# Patient Record
Sex: FEMALE | Employment: UNEMPLOYED | URBAN - METROPOLITAN AREA
[De-identification: names, ages, dates, MRNs, and addresses within clinical notes are randomized per-mention and may not be internally consistent; named-entity substitution may affect disease eponyms.]

---

## 2024-07-17 ENCOUNTER — HOSPITAL ENCOUNTER (EMERGENCY)
Facility: HOSPITAL | Age: 44
Discharge: HOME | End: 2024-07-17
Attending: EMERGENCY MEDICINE
Payer: COMMERCIAL

## 2024-07-17 VITALS
RESPIRATION RATE: 17 BRPM | OXYGEN SATURATION: 98 % | DIASTOLIC BLOOD PRESSURE: 66 MMHG | SYSTOLIC BLOOD PRESSURE: 116 MMHG | WEIGHT: 134.26 LBS | TEMPERATURE: 99.2 F | HEART RATE: 74 BPM

## 2024-07-17 DIAGNOSIS — L50.9 URTICARIA: Primary | ICD-10-CM

## 2024-07-17 PROCEDURE — 99284 EMERGENCY DEPT VISIT MOD MDM: CPT | Performed by: EMERGENCY MEDICINE

## 2024-07-17 PROCEDURE — 99282 EMERGENCY DEPT VISIT SF MDM: CPT

## 2024-07-17 RX ORDER — FEXOFENADINE HCL 180 MG/1
180 TABLET ORAL DAILY
Qty: 30 TABLET | Refills: 0 | Status: SHIPPED | OUTPATIENT
Start: 2024-07-17 | End: 2024-08-16

## 2024-07-17 RX ORDER — LORATADINE 10 MG/1
10 TABLET ORAL ONCE
Status: COMPLETED | OUTPATIENT
Start: 2024-07-17 | End: 2024-07-17

## 2024-07-17 RX ORDER — PREDNISONE 20 MG/1
40 TABLET ORAL DAILY
Qty: 10 TABLET | Refills: 0 | Status: SHIPPED | OUTPATIENT
Start: 2024-07-17 | End: 2024-07-22

## 2024-07-17 RX ADMIN — LORATADINE 10 MG: 10 TABLET ORAL at 13:18

## 2024-07-17 NOTE — DISCHARGE INSTRUCTIONS
Empiece a diane fexofenadina según las indicaciones. Laurie es un antihistamínico no sedante que puede ayudar con la picazón. \    Si todavía tiene mucha picazón, puede diane difenhidramina 25 mg - 50 mg cada 6 a 8 horas según sea necesario para la picazón sherif los próximos dos días.    Empiece a diane prednisona hoy y termine los 5 días.    Lexi un seguimiento con amaro médico de atención primaria para sierra evaluación adicional y posibles pruebas de alergia.

## 2024-07-17 NOTE — Clinical Note
Marya Bello was seen and treated in our emergency department on 7/17/2024.                Diagnosis:     Marya  may return to work on return date.    She may return on this date: 07/18/2024         If you have any questions or concerns, please don't hesitate to call.      Alysha Dumont, DO    ______________________________           _______________          _______________  Hospital Representative                              Date                                Time

## 2024-07-17 NOTE — ED PROVIDER NOTES
History  Chief Complaint   Patient presents with    Rash     Head/torso. Unable to identify cause. Has had similar in past. Took benadryl yesterday but didn't notice a change     43y F here for evaluation of rash that started yesterday. Reports she went outside yesterday evening to bring some things in before the rain and approx 2h later, started w/ hives.  Denies being in contact w/ any bushes/plants while she was outside.  Rash continues today. Notes it's very itching. Denies any oral involvement, no sob/foreman.     Seen here a few months ago for same symptoms and no trigger identified at that time. Pt still denies new soaps, lotions, detergents, no NSAIDs, no supplements, no recent illness, no plant exposures.  No hx of similar rashes other than a few months ago      History provided by:  Patient   used: Yes ([id number])    Rash      Prior to Admission Medications   Prescriptions Last Dose Informant Patient Reported? Taking?   diphenhydrAMINE (BENADRYL) 25 mg tablet   No No   Sig: Take 1 tablet (25 mg total) by mouth every 6 (six) hours as needed for itching   diphenhydrAMINE (BENADRYL) 25 mg tablet   No No   Sig: Take 1 tablet (25 mg total) by mouth every 6 (six) hours   hydrOXYzine HCL (ATARAX) 25 mg tablet   No No   Sig: Take 1 tablet (25 mg total) by mouth every 6 (six) hours   Patient not taking: Reported on 5/5/2024   loratadine (CLARITIN) 10 mg tablet   No No   Sig: Take 1 tablet (10 mg total) by mouth daily for 10 days   loratadine (CLARITIN) 10 mg tablet   No No   Sig: Take 1 tablet (10 mg total) by mouth daily      Facility-Administered Medications: None       History reviewed. No pertinent past medical history.    History reviewed. No pertinent surgical history.    History reviewed. No pertinent family history.  I have reviewed and agree with the history as documented.    E-Cigarette/Vaping    E-Cigarette Use Never User      E-Cigarette/Vaping Substances    Nicotine No      Social History      Tobacco Use    Smoking status: Never    Smokeless tobacco: Never   Vaping Use    Vaping status: Never Used   Substance Use Topics    Alcohol use: Never    Drug use: Never       Review of Systems   Skin:  Positive for rash.   All other systems reviewed and are negative.      Physical Exam  Physical Exam  Vitals and nursing note reviewed.   Constitutional:       Appearance: Normal appearance.   HENT:      Mouth/Throat:      Mouth: Mucous membranes are moist. No angioedema.      Pharynx: No uvula swelling.   Eyes:      Conjunctiva/sclera: Conjunctivae normal.   Cardiovascular:      Rate and Rhythm: Normal rate.   Pulmonary:      Effort: Pulmonary effort is normal.   Musculoskeletal:      Cervical back: Normal range of motion.   Skin:     General: Skin is warm.      Findings: Rash present. Rash is urticarial.          Neurological:      General: No focal deficit present.      Mental Status: She is alert.   Psychiatric:         Mood and Affect: Mood normal.         Vital Signs  ED Triage Vitals [07/17/24 1248]   Temperature Pulse Respirations Blood Pressure SpO2   99.2 °F (37.3 °C) 74 17 116/66 98 %      Temp Source Heart Rate Source Patient Position - Orthostatic VS BP Location FiO2 (%)   Temporal Monitor -- -- --      Pain Score       No Pain           Vitals:    07/17/24 1248   BP: 116/66   Pulse: 74         Visual Acuity      ED Medications  Medications   loratadine (CLARITIN) tablet 10 mg (10 mg Oral Given 7/17/24 1318)       Diagnostic Studies  Results Reviewed       None                   No orders to display              Procedures  Procedures         ED Course                                 SBIRT 22yo+      Flowsheet Row Most Recent Value   Initial Alcohol Screen: US AUDIT-C     1. How often do you have a drink containing alcohol? 0 Filed at: 07/17/2024 1249   2. How many drinks containing alcohol do you have on a typical day you are drinking?  0 Filed at: 07/17/2024 1249   3b. FEMALE Any Age, or MALE 65+:  How often do you have 4 or more drinks on one occassion? 0 Filed at: 07/17/2024 1249   Audit-C Score 0 Filed at: 07/17/2024 1249   FARAZ: How many times in the past year have you...    Used an illegal drug or used a prescription medication for non-medical reasons? Never Filed at: 07/17/2024 1249                      Medical Decision Making  Acute urticaria w/ no oral involvement and no known trigger. Will treat w/ antihistamines, short steroid burst and refer pt to allergy/immunology +/- PCP for allergy testing.    Problems Addressed:  Urticaria: acute illness or injury    Amount and/or Complexity of Data Reviewed  External Data Reviewed: notes.     Details: Prior visit reviewed                 Disposition  Final diagnoses:   Urticaria     Time reflects when diagnosis was documented in both MDM as applicable and the Disposition within this note       Time User Action Codes Description Comment    7/17/2024  1:08 PM Alysha Dumont Add [L50.9] Urticaria           ED Disposition       ED Disposition   Discharge    Condition   Stable    Date/Time   Wed Jul 17, 2024 1308    Comment   Marya Bello discharge to home/self care.                   Follow-up Information       Follow up With Specialties Details Why Contact Info Additional Information    [facility] Schedule an appointment as soon as possible for a visit  for further evaluation and treatment [address] [place] [id number] [phone] [facility], [address] [place] [address], [place], [place], [address] [phone]    [facility] Schedule an appointment as soon as possible for a visit  for further evaluation and treatment [address] [place] [address]  [address] [place] [id number] [phone] [facility], [address] [place], [place] [address] [address] [facility][note 3], [place] [address]             Patient's Medications   Discharge Prescriptions    FEXOFENADINE (ALLEGRA) 180 MG TABLET    Take 1 tablet (180 mg total) by mouth daily       Start Date: 7/17/2024 End Date: 8/16/2024       Order Dose: 180 mg       Quantity: 30 tablet    Refills: 0    PREDNISONE 20 MG TABLET    Take 2 tablets (40 mg total) by mouth daily for 5 days       Start Date: 7/17/2024 End Date: 7/22/2024       Order Dose: 40 mg       Quantity: 10 tablet    Refills: 0       No discharge procedures on file.    PDMP Review       None            ED Provider  Electronically Signed by             Alysha Dumont DO  07/17/24 8958

## 2024-08-14 ENCOUNTER — VBI (OUTPATIENT)
Dept: ADMINISTRATIVE | Facility: OTHER | Age: 44
End: 2024-08-14

## 2024-08-14 NOTE — TELEPHONE ENCOUNTER
08/14/24 12:58 PM     Chart reviewed for Pap Smear (HPV) aka Cervical Cancer Screening ; nothing is submitted to the patient's insurance at this time.     Daniela Reid MA   [facility]

## 2024-11-19 ENCOUNTER — OFFICE VISIT (OUTPATIENT)
Dept: FAMILY MEDICINE CLINIC | Facility: CLINIC | Age: 44
End: 2024-11-19

## 2024-11-19 VITALS
DIASTOLIC BLOOD PRESSURE: 74 MMHG | HEIGHT: 56 IN | TEMPERATURE: 98.3 F | WEIGHT: 120.1 LBS | HEART RATE: 66 BPM | BODY MASS INDEX: 27.02 KG/M2 | OXYGEN SATURATION: 98 % | RESPIRATION RATE: 18 BRPM | SYSTOLIC BLOOD PRESSURE: 118 MMHG

## 2024-11-19 DIAGNOSIS — Z11.59 NEED FOR HEPATITIS C SCREENING TEST: ICD-10-CM

## 2024-11-19 DIAGNOSIS — Z23 NEED FOR COVID-19 VACCINE: ICD-10-CM

## 2024-11-19 DIAGNOSIS — Z12.31 ENCOUNTER FOR SCREENING MAMMOGRAM FOR BREAST CANCER: ICD-10-CM

## 2024-11-19 DIAGNOSIS — Z11.4 SCREENING FOR HIV (HUMAN IMMUNODEFICIENCY VIRUS): ICD-10-CM

## 2024-11-19 DIAGNOSIS — E66.3 OVERWEIGHT (BMI 25.0-29.9): Primary | ICD-10-CM

## 2024-11-19 DIAGNOSIS — Z23 ENCOUNTER FOR IMMUNIZATION: ICD-10-CM

## 2024-11-19 PROCEDURE — 99204 OFFICE O/P NEW MOD 45 MIN: CPT

## 2024-11-19 PROCEDURE — 90480 ADMN SARSCOV2 VAC 1/ONLY CMP: CPT

## 2024-11-19 PROCEDURE — 90656 IIV3 VACC NO PRSV 0.5 ML IM: CPT

## 2024-11-19 PROCEDURE — 91320 SARSCV2 VAC 30MCG TRS-SUC IM: CPT

## 2024-11-19 PROCEDURE — 90471 IMMUNIZATION ADMIN: CPT

## 2024-11-19 NOTE — PROGRESS NOTES
Name: Marya Bello      : 1980      MRN: [id number]  Encounter Provider: CHIARA Harrington  Encounter Date: 2024   Encounter department: [facility]  :  Assessment & Plan  Overweight (BMI 25.0-29.9)    Orders:    Basic metabolic panel; Future    Hemoglobin A1C; Future    Lipid Panel with Direct LDL reflex; Future    Need for hepatitis C screening test    Orders:    Hepatitis C Antibody; Future    Screening for HIV (human immunodeficiency virus)    Orders:    HIV 1/2 AG/AB w Reflex [facility] for 2 yr old and above; Future    Encounter for screening mammogram for breast cancer    Orders:    Mammo screening bilateral w 3d and cad; Future    Need for COVID-19 vaccine    Orders:    COVID-19 Pfizer mRNA vaccine 12 yr and older (Comirnaty pre-filled syringe)    Encounter for immunization    Orders:    influenza vaccine preservative-free 0.5 mL IM (Fluzone, Afluria, Fluarix, Flulaval)        BMI Counseling: Body mass index is 26.93 kg/m². The BMI is above normal. Nutrition recommendations include decreasing portion sizes, encouraging healthy choices of fruits and vegetables, decreasing fast food intake, consuming healthier snacks, limiting drinks that contain sugar, moderation in carbohydrate intake, increasing intake of lean protein, reducing intake of saturated and trans fat and reducing intake of cholesterol. Exercise recommendations include moderate physical activity 150 minutes/week. No pharmacotherapy was ordered. Rationale for BMI follow-up plan is due to patient being overweight or obese.     Depression Screening and Follow-up Plan: Patient was screened for depression during today's encounter. They screened negative with a PHQ-2 score of 0.      History of Present Illness     Marya Bello is a 44 y.o. female  has no past medical history on file.  has no past surgical history on file.    Pt presents today to establish care. Denies any acute  "complaints.        Review of Systems   Constitutional:  Negative for chills and fever.   HENT:  Negative for ear pain and sore throat.    Eyes:  Negative for pain and visual disturbance.   Respiratory:  Negative for cough and shortness of breath.    Cardiovascular:  Negative for chest pain and palpitations.   Gastrointestinal:  Negative for abdominal pain and vomiting.   Genitourinary:  Negative for dysuria and hematuria.   Musculoskeletal:  Negative for arthralgias and back pain.   Skin:  Negative for color change and rash.   Neurological:  Negative for seizures and syncope.   All other systems reviewed and are negative.    Past Medical History   History reviewed. No pertinent past medical history.  History reviewed. No pertinent surgical history.  History reviewed. No pertinent family history.   reports that she has never smoked. She has never used smokeless tobacco. She reports that she does not drink alcohol and does not use drugs.  Current Outpatient Medications on File Prior to Visit   Medication Sig Dispense Refill    [DISCONTINUED] diphenhydrAMINE (BENADRYL) 25 mg tablet Take 1 tablet (25 mg total) by mouth every 6 (six) hours as needed for itching 30 tablet 0    [DISCONTINUED] hydrOXYzine HCL (ATARAX) 25 mg tablet Take 1 tablet (25 mg total) by mouth every 6 (six) hours 12 tablet 0    [DISCONTINUED] loratadine (CLARITIN) 10 mg tablet Take 1 tablet (10 mg total) by mouth daily 20 tablet 0    [DISCONTINUED] diphenhydrAMINE (BENADRYL) 25 mg tablet Take 1 tablet (25 mg total) by mouth every 6 (six) hours 20 tablet 0    [DISCONTINUED] fexofenadine (ALLEGRA) 180 MG tablet Take 1 tablet (180 mg total) by mouth daily 30 tablet 0     No current facility-administered medications on file prior to visit.   No Known Allergies        Objective   /74 (BP Location: Left arm, Patient Position: Sitting, Cuff Size: Standard)   Pulse 66   Temp 98.3 °F (36.8 °C) (Temporal)   Resp 18   Ht 4' 8\" (1.422 m)   Wt 54.5 kg " (120 lb 1.6 oz)   LMP 10/22/2024 (Exact Date)   SpO2 98%   BMI 26.93 kg/m²      Physical Exam  Vitals and nursing note reviewed.   Constitutional:       General: She is not in acute distress.     Appearance: Normal appearance. She is well-developed.   HENT:      Head: Normocephalic and atraumatic.      Right Ear: External ear normal.      Left Ear: External ear normal.      Nose: Nose normal.   Eyes:      Conjunctiva/sclera: Conjunctivae normal.   Cardiovascular:      Rate and Rhythm: Normal rate and regular rhythm.      Pulses: Normal pulses.      Heart sounds: Normal heart sounds. No murmur heard.  Pulmonary:      Effort: Pulmonary effort is normal. No respiratory distress.      Breath sounds: Normal breath sounds.   Abdominal:      Palpations: Abdomen is soft.      Tenderness: There is no abdominal tenderness.   Musculoskeletal:         General: No swelling. Normal range of motion.      Cervical back: Normal range of motion and neck supple.   Skin:     General: Skin is warm and dry.      Capillary Refill: Capillary refill takes less than 2 seconds.   Neurological:      General: No focal deficit present.      Mental Status: She is alert and oriented to person, place, and time. Mental status is at baseline.   Psychiatric:         Mood and Affect: Mood normal.         Behavior: Behavior normal.         Thought Content: Thought content normal.         Judgment: Judgment normal.

## 2024-11-21 ENCOUNTER — APPOINTMENT (OUTPATIENT)
Dept: LAB | Facility: CLINIC | Age: 44
End: 2024-11-21
Payer: COMMERCIAL

## 2024-11-21 ENCOUNTER — OFFICE VISIT (OUTPATIENT)
Dept: DENTISTRY | Facility: CLINIC | Age: 44
End: 2024-11-21

## 2024-11-21 VITALS — HEART RATE: 51 BPM | SYSTOLIC BLOOD PRESSURE: 101 MMHG | TEMPERATURE: 98.2 F | DIASTOLIC BLOOD PRESSURE: 66 MMHG

## 2024-11-21 DIAGNOSIS — K02.9 CARIES: ICD-10-CM

## 2024-11-21 DIAGNOSIS — Z11.59 NEED FOR HEPATITIS C SCREENING TEST: ICD-10-CM

## 2024-11-21 DIAGNOSIS — Z11.4 SCREENING FOR HIV (HUMAN IMMUNODEFICIENCY VIRUS): ICD-10-CM

## 2024-11-21 DIAGNOSIS — E66.3 OVERWEIGHT (BMI 25.0-29.9): ICD-10-CM

## 2024-11-21 DIAGNOSIS — K02.9 COMPLEX DENTAL CARIES: Primary | ICD-10-CM

## 2024-11-21 DIAGNOSIS — K05.10 GINGIVITIS: ICD-10-CM

## 2024-11-21 LAB
ANION GAP SERPL CALCULATED.3IONS-SCNC: 6 MMOL/L (ref 4–13)
BUN SERPL-MCNC: 12 MG/DL (ref 5–25)
CALCIUM SERPL-MCNC: 8.9 MG/DL (ref 8.4–10.2)
CHLORIDE SERPL-SCNC: 105 MMOL/L (ref 96–108)
CHOLEST SERPL-MCNC: 179 MG/DL (ref ?–200)
CO2 SERPL-SCNC: 26 MMOL/L (ref 21–32)
CREAT SERPL-MCNC: 0.73 MG/DL (ref 0.6–1.3)
EST. AVERAGE GLUCOSE BLD GHB EST-MCNC: 108 MG/DL
GFR SERPL CREATININE-BSD FRML MDRD: 101 ML/MIN/1.73SQ M
GLUCOSE P FAST SERPL-MCNC: 77 MG/DL (ref 65–99)
HBA1C MFR BLD: 5.4 %
HCV AB SER QL: NORMAL
HDLC SERPL-MCNC: 53 MG/DL
HIV 1+2 AB+HIV1 P24 AG SERPL QL IA: NORMAL
HIV 2 AB SERPL QL IA: NORMAL
HIV1 AB SERPL QL IA: NORMAL
HIV1 P24 AG SERPL QL IA: NORMAL
LDLC SERPL CALC-MCNC: 112 MG/DL (ref 0–100)
POTASSIUM SERPL-SCNC: 4.2 MMOL/L (ref 3.5–5.3)
SODIUM SERPL-SCNC: 137 MMOL/L (ref 135–147)
TRIGL SERPL-MCNC: 69 MG/DL (ref ?–150)

## 2024-11-21 PROCEDURE — D0150 COMPREHENSIVE ORAL EVALUATION - NEW OR ESTABLISHED PATIENT: HCPCS | Performed by: DENTIST

## 2024-11-21 PROCEDURE — 87389 HIV-1 AG W/HIV-1&-2 AB AG IA: CPT

## 2024-11-21 PROCEDURE — D0274 BITEWINGS - 4 RADIOGRAPHIC IMAGES: HCPCS | Performed by: DENTIST

## 2024-11-21 PROCEDURE — 80048 BASIC METABOLIC PNL TOTAL CA: CPT

## 2024-11-21 PROCEDURE — D0330 PANORAMIC RADIOGRAPHIC IMAGE: HCPCS | Performed by: DENTIST

## 2024-11-21 PROCEDURE — 83036 HEMOGLOBIN GLYCOSYLATED A1C: CPT

## 2024-11-21 PROCEDURE — 80061 LIPID PANEL: CPT

## 2024-11-21 PROCEDURE — 36415 COLL VENOUS BLD VENIPUNCTURE: CPT

## 2024-11-21 PROCEDURE — 86803 HEPATITIS C AB TEST: CPT

## 2024-11-21 NOTE — PROGRESS NOTES
"Comprehensive Exam/PAN and 4BW Radiographs     Marya Bello 44 y.o. female presents with self to [facility] for comprehensive exam.  PMH reviewed, no changes, ASA I. Significant medical history: denies. Significant allergies: denies. Significant medications: denies.  Pain level: 0/10  Chief complaint: \"check up\".   Dental History: patient was seen on 12/29/2022 for COMP, FMX and TXP by other provider. Patient was seen on 12/30/20222 by Hyg. For prophy. See notes.   Radiographs: current FMX was taken 12/29/2022. PAN and 4BW are taken today.   Consent:  Reviewed procedures involved with comprehensive exam including radiographs, oral exam, and periodontal probing.   Patient understands and consent was given by self via verbal consent.  Oral cancer screening: normal.  Extraoral exam: no remarkable findings.  Intraoral exam: gingival inflammation. Caries.   Periodontal exam: Updated perio charting. See chart.   Hygiene - Good.  Plaque - Mild.  Horizontal bone loss -Localized.   Vertical bone loss - None.  Subgingival calculus - Localized.  BOP - Localized.  Mobility - None.  Furcation involvements - None.  Occlusal trauma - traumatic cheek bite around wisdom teeth.   Periodontal Stage: Mild gingivitis.  Periodontal Grade: A.  Periodontal Plan: Prophy.    Caries exam:   Caries detected: teeth #2 occ. #15 Occ, #19 BP, #31 OB. Recommended restorations. Watch teeth #3,14,18 occlusal. Teeth #1,16,17,32 deep occlusal caries and inaccessible to care specially #1 and #16 with traumatic occlusion, recommended extractions.   Occlusal assessment:  VDO / restorative space - Normal.  AP Classification -  Right: Canine Class I; Molar Class I.  Left: Canine Class I; Molar Class I.  Lower anterior crowding. Recommended ortho but patient not interested.   Tx plan:  1- Prophy.  2- Restoration teeth #2 occ. #15 Occ, #19 BP, #31 OB.  3- Referred to [facility] for extractions of teeth #1,16,17,32.   Referral(s): [facility] for extractions of teeth " #1,16,17,32 .  Rx: None.  Recommended recall schedule: 6 months.  POI is given. Reviewed oral hygiene and need for recall visits.   Patient dismissed ambulatory and alert.  NV1: Prophy.  NV2: Restorative care.

## 2024-11-22 ENCOUNTER — RESULTS FOLLOW-UP (OUTPATIENT)
Dept: FAMILY MEDICINE CLINIC | Facility: CLINIC | Age: 44
End: 2024-11-22

## 2024-12-05 ENCOUNTER — OFFICE VISIT (OUTPATIENT)
Dept: DENTISTRY | Facility: CLINIC | Age: 44
End: 2024-12-05

## 2024-12-05 VITALS — HEART RATE: 64 BPM | DIASTOLIC BLOOD PRESSURE: 73 MMHG | TEMPERATURE: 97.5 F | SYSTOLIC BLOOD PRESSURE: 113 MMHG

## 2024-12-05 DIAGNOSIS — K03.6 DENTAL CALCULUS: ICD-10-CM

## 2024-12-05 DIAGNOSIS — K03.6 ACCRETIONS ON TEETH: Primary | ICD-10-CM

## 2024-12-05 PROCEDURE — D1110 PROPHYLAXIS - ADULT: HCPCS

## 2024-12-06 ENCOUNTER — OFFICE VISIT (OUTPATIENT)
Dept: DENTISTRY | Facility: CLINIC | Age: 44
End: 2024-12-06

## 2024-12-06 VITALS — SYSTOLIC BLOOD PRESSURE: 108 MMHG | DIASTOLIC BLOOD PRESSURE: 57 MMHG | HEART RATE: 74 BPM

## 2024-12-06 DIAGNOSIS — K02.9 CARIES: Primary | ICD-10-CM

## 2024-12-06 PROCEDURE — D2392 RESIN-BASED COMPOSITE - 2 SURFACES, POSTERIOR: HCPCS

## 2024-12-06 NOTE — PROGRESS NOTES
Composite Restoration #31-OB    Marya Bello 44 y.o. female presents with self to [facility] for composite restoration  PMH reviewed, no changes, ASA I. Significant medical history: NSF. Significant allergies: NKA. Significant medications: NSF.    Diagnosis:  Caries #31OB  Pt has caries on #32, pt deferred extracting #1, 16, 17, 32 due to dental anxiety. Explained to patient the risks of caries progressing to pulpitis/symptoms. Per attending best not to attempt occlusal resin and to wait for patient to have #32 ext.     Prognosis:  excellent    Consent:  Risks of specific procedure: need for RCT if pulp exposure occurs or in future if pulp is inflamed, need to revise tx plan based on extent of decay, damage to adjacent tooth and/or restoration.  Risks of any dental procedure: post procedural pain or sensitivity, local anesthetic side effects, allergic reaction to dental materials and medications, breakage of local anesthetic needle, aspiration of small dental tools, injury to nearby hard and soft tissues and anatomical structures.  Benefits: prevent further breakdown of tooth and its sequelae.  Alternatives: no tx.  Tx plan for composite restoration #31-OB reviewed. Opportunity to ask questions given, all questions answered to degree of medical and dental certainty.  Patient understands and consent given by self via verbal consent.    Anesthesia:  Topical 20% benzocaine.  2 carps 2% Lidocaine 1:100k epi via ANTON block and buccal infiltration.    Procedure details:  Isolation: cotton rolls and high volume suction  Prepped teeth #31-OB with high speed handpiece.  Band placement: not applicable/needed for this restoration.   Etch with 37% H2PO4 15 seconds. Rinsed and suctioned.  Applied  with 20 second scrub, air dried, and light cured.  Restored with packable (A2 shade) and light cured.  Checked occlusion and adjusted with finishing burs.  Polished with enhance point.  Verified occlusion.    Patient  dismissed ambulatory and alert.    NV: #2O.    Attending:  Dr. Barger was present in the clinic

## 2024-12-09 ENCOUNTER — OFFICE VISIT (OUTPATIENT)
Dept: DENTISTRY | Facility: CLINIC | Age: 44
End: 2024-12-09

## 2024-12-09 VITALS — TEMPERATURE: 99.1 F | HEART RATE: 66 BPM | SYSTOLIC BLOOD PRESSURE: 114 MMHG | DIASTOLIC BLOOD PRESSURE: 74 MMHG

## 2024-12-09 DIAGNOSIS — K02.9 CARIES: Primary | ICD-10-CM

## 2024-12-09 PROCEDURE — D2391 RESIN-BASED COMPOSITE - 1 SURFACE, POSTERIOR: HCPCS | Performed by: DENTIST

## 2024-12-09 NOTE — PROGRESS NOTES
Composite Restoration #19 Buccal Pit    Marya Bello 44 y.o. female presents with self to [facility] for composite restoration  PMH reviewed, no changes, ASA I. Significant medical history: denies. Significant allergies: denies. Significant medications: denies.  Pain Level: 0/10  Diagnosis: tooth #19 buccal pit caries.   Radiographs: current.   Consent:  Risks of specific procedure: need for RCT if pulp exposure occurs or in future if pulp is inflamed, need to revise tx plan based on extent of decay, damage to adjacent tooth and/or restoration.  Risks of any dental procedure: post procedural pain or sensitivity, local anesthetic side effects, allergic reaction to dental materials and medications, breakage of local anesthetic needle, aspiration of small dental tools, injury to nearby hard and soft tissues and anatomical structures.  Benefits: prevent further breakdown of tooth and its sequelae.  Alternatives:  no tx.  Tx plan for composite restoration #19 buccal pit reviewed. Opportunity to ask questions given, all questions answered to degree of medical and dental certainty.  Patient understands and consent given by self via verbal consent.  Anesthesia:  Topical 20% benzocaine.  One half carps 2% Lidocaine 1:100k epi via buccal infiltration.  Patient has anxiety to dental needle.   Procedure details:  Isolation: cotton rolls, dry angles, and high volume suction  Prepped tooth #19 buccal pit with high speed handpiece.  Caries removed with round carbide on slow speed.  Band placement: not applicable/needed for this restoration.   Etch with 37% H2PO4 15 seconds. Rinsed and suctioned.  Applied  with 20 second scrub, air dried, and light cured.  Restored with packable and flowable (A2 shade) and light cured.  Checked occlusion and adjusted with finishing burs.  Checked contacts with floss  Polished with enhance point.  Verified occlusion and contacts.  POI is given. Reviewed oral hygiene nad need for  recall visits.   Patient dismissed ambulatory and alert.  NV: Continue fillings.

## 2024-12-12 ENCOUNTER — OFFICE VISIT (OUTPATIENT)
Dept: DENTISTRY | Facility: CLINIC | Age: 44
End: 2024-12-12

## 2024-12-12 VITALS — TEMPERATURE: 98.4 F | SYSTOLIC BLOOD PRESSURE: 108 MMHG | HEART RATE: 85 BPM | DIASTOLIC BLOOD PRESSURE: 76 MMHG

## 2024-12-12 DIAGNOSIS — K02.9 CARIES: Primary | ICD-10-CM

## 2024-12-12 PROCEDURE — D2391 RESIN-BASED COMPOSITE - 1 SURFACE, POSTERIOR: HCPCS | Performed by: DENTIST

## 2024-12-12 NOTE — PROGRESS NOTES
Composite Restoration #2 Occlusal     Marya Bello 44 y.o. female presents with self to [facility] for composite restoration  PMH reviewed, no changes, ASA I. Significant medical history: denies. Significant allergies: denies. Significant medications: denies.  Pain Level: 0/10  Diagnosis: tooth #2 occlusal caries.   Radiographs: current.   Consent:  Risks of specific procedure: need for RCT if pulp exposure occurs or in future if pulp is inflamed, need to revise tx plan based on extent of decay, damage to adjacent tooth and/or restoration.  Risks of any dental procedure: post procedural pain or sensitivity, local anesthetic side effects, allergic reaction to dental materials and medications, breakage of local anesthetic needle, aspiration of small dental tools, injury to nearby hard and soft tissues and anatomical structures.  Benefits: prevent further breakdown of tooth and its sequelae.  Alternatives:  no tx.  Tx plan for composite restoration #2 occlusal reviewed. Opportunity to ask questions given, all questions answered to degree of medical and dental certainty.  Patient understands and consent given by self via verbal consent.  Anesthesia:  Topical 20% benzocaine.  One half carps 2% Lidocaine 1:100k epi via buccal infiltration.  Patient has anxiety to dental needle.   Procedure details:  Isolation: cotton rolls, dry angles, and high volume suction  Prepped tooth #2 occlusal with high speed handpiece.  Caries removed with round carbide on slow speed.  Band placement: not applicable/needed for this restoration.   Etch with 37% H2PO4 15 seconds. Rinsed and suctioned.  Applied  with 20 second scrub, air dried, and light cured.  Restored with packable and flowable (A2 shade) and light cured.  Checked occlusion and adjusted with finishing burs.  Checked contacts with floss  Polished with enhance point.  Verified occlusion and contacts.  POI is given. Reviewed oral hygiene nad need for recall visits.    Patient dismissed ambulatory and alert.  NV: Filling #15.

## 2025-01-03 ENCOUNTER — TELEPHONE (OUTPATIENT)
Dept: OBGYN CLINIC | Facility: CLINIC | Age: 45
End: 2025-01-03

## 2025-01-06 ENCOUNTER — OFFICE VISIT (OUTPATIENT)
Dept: OBGYN CLINIC | Facility: CLINIC | Age: 45
End: 2025-01-06

## 2025-01-06 VITALS
WEIGHT: 117.4 LBS | DIASTOLIC BLOOD PRESSURE: 77 MMHG | HEART RATE: 65 BPM | BODY MASS INDEX: 23.67 KG/M2 | HEIGHT: 59 IN | SYSTOLIC BLOOD PRESSURE: 125 MMHG

## 2025-01-06 DIAGNOSIS — Z23 NEED FOR HPV VACCINE: ICD-10-CM

## 2025-01-06 DIAGNOSIS — Z12.4 SCREENING FOR CERVICAL CANCER: ICD-10-CM

## 2025-01-06 DIAGNOSIS — Z12.31 ENCOUNTER FOR SCREENING MAMMOGRAM FOR MALIGNANT NEOPLASM OF BREAST: ICD-10-CM

## 2025-01-06 DIAGNOSIS — Z01.419 ROUTINE GYNECOLOGICAL EXAMINATION: Primary | ICD-10-CM

## 2025-01-06 DIAGNOSIS — B35.1 NAIL FUNGUS: ICD-10-CM

## 2025-01-06 PROCEDURE — 99386 PREV VISIT NEW AGE 40-64: CPT | Performed by: OBSTETRICS & GYNECOLOGY

## 2025-01-06 PROCEDURE — 90651 9VHPV VACCINE 2/3 DOSE IM: CPT | Performed by: OBSTETRICS & GYNECOLOGY

## 2025-01-06 PROCEDURE — G0476 HPV COMBO ASSAY CA SCREEN: HCPCS | Performed by: OBSTETRICS & GYNECOLOGY

## 2025-01-06 PROCEDURE — G0145 SCR C/V CYTO,THINLAYER,RESCR: HCPCS | Performed by: OBSTETRICS & GYNECOLOGY

## 2025-01-06 PROCEDURE — 90471 IMMUNIZATION ADMIN: CPT | Performed by: OBSTETRICS & GYNECOLOGY

## 2025-01-06 NOTE — PATIENT INSTRUCTIONS
Erica por amaro confianza en nuestro equipo.   Le agradecemos y agradecemos cindy comentarios.   Si recibe sierra encuesta nuestra, tómese unos momentos para informarnos cómo estamos.   Sinceramente,  Yajaidene Toussaint-Foster, DO

## 2025-01-06 NOTE — PROGRESS NOTES
Patient given 1st hpv on left deltoid on 1/6/25    NDC# 0014-0945-49  LOT# [id number]  EXP# [id number]

## 2025-01-06 NOTE — PROGRESS NOTES
"ANNUAL GYNECOLOGICAL EXAMINATION    Marya Bello is a 44 y.o. female who presents today for annual GYN exam.  Her last pap smear was performed 3yrs ago and result was negative.  She reports history of abnormal pap smears in her past.  Patient has not a  mammogram was performed.  She had HIV screening performed 24 and it was negative.  She reports menses as regular.  Patient's last menstrual period was 2024 (exact date).  Her general medical history has been reviewed and she reports it as follows:    History reviewed. No pertinent past medical history.  Past Surgical History:   Procedure Laterality Date    TUBAL LIGATION Bilateral      OB History          4    Para   4    Term   4       0    AB   0    Living   4         SAB   0    IAB   0    Ectopic   0    Multiple   0    Live Births   4               Social History     Tobacco Use    Smoking status: Never    Smokeless tobacco: Never   Vaping Use    Vaping status: Never Used   Substance Use Topics    Alcohol use: Never    Drug use: Never     Social History     Substance and Sexual Activity   Sexual Activity Yes     Cancer-related family history is not on file.    No current outpatient medications    Review of Systems:  Review of Systems   Genitourinary:  Negative for menstrual problem, pelvic pain, vaginal bleeding and vaginal discharge.   All other systems reviewed and are negative.      Physical Exam:  /77 (BP Location: Left arm, Patient Position: Sitting, Cuff Size: Standard)   Pulse 65   Ht 4' 11\" (1.499 m)   Wt 53.3 kg (117 lb 6.4 oz)   LMP 2024 (Exact Date)   BMI 23.71 kg/m²   Physical Exam  Constitutional:       Appearance: Normal appearance.   Genitourinary:      Bladder and urethral meatus normal.      No lesions in the vagina.      Right Labia: No rash, tenderness or lesions.     Left Labia: No tenderness, lesions or rash.     No inguinal adenopathy present in the right or left side.     No vaginal " discharge.        Right Adnexa: not tender, not full and no mass present.     Left Adnexa: not tender, not full and no mass present.     No cervical motion tenderness, discharge or lesion.      Uterus is not enlarged or tender.      No uterine mass detected.     No urethral tenderness or mass present.   Breasts:     Breasts are soft.     Right: No swelling, inverted nipple, mass, nipple discharge, skin change or tenderness.      Left: No swelling, inverted nipple, mass, nipple discharge, skin change or tenderness.   HENT:      Head: Normocephalic and atraumatic.   Cardiovascular:      Rate and Rhythm: Normal rate and regular rhythm.   Pulmonary:      Effort: Pulmonary effort is normal.      Breath sounds: Normal breath sounds.   Abdominal:      General: Bowel sounds are normal.      Palpations: Abdomen is soft.      Hernia: There is no hernia in the left inguinal area or right inguinal area.   Musculoskeletal:         General: Normal range of motion.      Cervical back: Normal range of motion and neck supple.   Lymphadenopathy:      Upper Body:      Right upper body: No supraclavicular or axillary adenopathy.      Left upper body: No supraclavicular or axillary adenopathy.      Lower Body: No right inguinal adenopathy. No left inguinal adenopathy.   Neurological:      Mental Status: She is alert and oriented to person, place, and time.   Skin:     General: Skin is warm and dry.   Psychiatric:         Mood and Affect: Mood normal.   Vitals and nursing note reviewed.           Assessment/Plan:   1. Normal well-woman GYN exam.  2. Cervical cancer screening:  Normal cervical exam.  Pap smear done with HPV co-testing.  Has not received HPV vaccine in the past, but she desires to initiate vaccine series now.  Given HPV vaccine today and she will return in 2 and 6 months for subsequent vaccinations.     3. STD screening:  Patient declines.   4. Breast cancer screening:  Normal breast exam.  Order placed for bilateral  screening mammogram.  Reviewed breast self-awareness.   5. Depression Screening: Patient's depression screening was assessed with a PHQ-2 score of 0. Clinically patient does not have depression. No treatment is required.     6. BMI Counseling: Body mass index is 23.71 kg/m².    7. Contraception:  tubal ligation   8. Return to office 1yr/prn, 2&6mos HPV vaccine series.   9. Podiatry referral for fungus on the big toe    Reviewed with patient that test results are available in [facility] immediately, but that they will not necessarily be reviewed by me immediately.  Explained that I will review results at my earliest opportunity and contact patient appropriately.

## 2025-01-07 LAB
HPV HR 12 DNA CVX QL NAA+PROBE: NEGATIVE
HPV16 DNA CVX QL NAA+PROBE: NEGATIVE
HPV18 DNA CVX QL NAA+PROBE: NEGATIVE

## 2025-01-09 ENCOUNTER — OFFICE VISIT (OUTPATIENT)
Dept: DENTISTRY | Facility: CLINIC | Age: 45
End: 2025-01-09

## 2025-01-09 VITALS — HEART RATE: 73 BPM | TEMPERATURE: 96.2 F | DIASTOLIC BLOOD PRESSURE: 70 MMHG | SYSTOLIC BLOOD PRESSURE: 107 MMHG

## 2025-01-09 DIAGNOSIS — K08.9 EXTRACTION OF TOOTH NEEDED: ICD-10-CM

## 2025-01-09 DIAGNOSIS — K02.62 DENTIN CARIES: Primary | ICD-10-CM

## 2025-01-09 NOTE — DENTAL PROCEDURE DETAILS
Patient presents w/ self and daughter for a dental restoration and verbally consents for treatment:  Reviewed health history-  Pt is ASA type I  Treatment consents signed: Yes  Perio: Healthy  Pain Scale: 0  Caries Assessment: Medium    Radiographs: Films are current  Oral Hygiene instruction reviewed and given  Hygiene recall visits recommended to the patient    Patient agrees with the diagnosis of Caries and the proposed treatment plan for the resin restoration:  Tooth #15-O  Dental Anesthesia:  1 carp 2% Lidocaine w/ 1:100k epi (buccal infiltration) and 1 carp 4% Septocaine w. 1:100k epi (buccal infiltration)  Material:   Etch Ivoclar bond and packable resin   Shade: Shade A2    Prognosis is Good.   Referrals Needed: No  Next visit: 6 month recall

## 2025-01-09 NOTE — PROGRESS NOTES
Procedure Details  15 O  - RESIN-BASED COMPOSITE - 1 SURFACE, POSTERIOR  Patient presents w/ self and daughter for a dental restoration and verbally consents for treatment:  Reviewed health history-  Pt is ASA type I  Treatment consents signed: Yes  Perio: Healthy  Pain Scale: 0  Caries Assessment: Medium    Radiographs: Films are current  Oral Hygiene instruction reviewed and given  Hygiene recall visits recommended to the patient    Patient agrees with the diagnosis of Caries and the proposed treatment plan for the resin restoration:  Tooth #15-O  Dental Anesthesia:  1 carp 2% Lidocaine w/ 1:100k epi (buccal infiltration) and 1 carp 4% Septocaine w. 1:100k epi (buccal infiltration)  Material:   Etch Ivoclar bond and packable resin   Shade: Shade A2    Prognosis is Good.   Referrals Needed: No  Next visit: 6 month recall

## 2025-01-10 ENCOUNTER — TELEPHONE (OUTPATIENT)
Dept: DENTISTRY | Facility: CLINIC | Age: 45
End: 2025-01-10

## 2025-01-10 LAB
LAB AP GYN PRIMARY INTERPRETATION: NORMAL
Lab: NORMAL

## 2025-01-11 ENCOUNTER — HOSPITAL ENCOUNTER (OUTPATIENT)
Dept: MAMMOGRAPHY | Facility: CLINIC | Age: 45
Discharge: HOME | End: 2025-01-11
Payer: COMMERCIAL

## 2025-01-11 VITALS — BODY MASS INDEX: 23.59 KG/M2 | HEIGHT: 59 IN | WEIGHT: 117 LBS

## 2025-01-11 DIAGNOSIS — Z12.31 ENCOUNTER FOR SCREENING MAMMOGRAM FOR MALIGNANT NEOPLASM OF BREAST: ICD-10-CM

## 2025-01-11 PROCEDURE — 77067 SCR MAMMO BI INCL CAD: CPT

## 2025-01-11 PROCEDURE — 77063 BREAST TOMOSYNTHESIS BI: CPT

## 2025-01-13 ENCOUNTER — OFFICE VISIT (OUTPATIENT)
Dept: DENTISTRY | Facility: CLINIC | Age: 45
End: 2025-01-13

## 2025-01-13 VITALS — HEART RATE: 76 BPM | TEMPERATURE: 97.7 F | DIASTOLIC BLOOD PRESSURE: 83 MMHG | SYSTOLIC BLOOD PRESSURE: 134 MMHG

## 2025-01-13 DIAGNOSIS — Z01.21 ENCOUNTER FOR DENTAL EXAMINATION AND CLEANING WITH ABNORMAL FINDINGS: Primary | ICD-10-CM

## 2025-01-13 PROCEDURE — D9110 PALLIATIVE (EMERGENCY) TREATMENT OF DENTAL PAIN - MINOR PROCEDURE: HCPCS | Performed by: DENTIST

## 2025-01-13 NOTE — PROGRESS NOTES
Pt. Presented for a limited oral exam complaining from pain when biting on # 15 after a recent filling there.    Reviewing MD and the existing x-rays.  ASA : II  Pain level : 0 --> 4 ( when occluding only )    Clinical exam revealing a high point on the recent filling of # 15.    Palliative ( localized occlusal adjustment ) done no anesthesia needed.  Pt. [place] much better     She left satisfied.    NV : Filling

## 2025-01-25 ENCOUNTER — HOSPITAL ENCOUNTER (EMERGENCY)
Facility: HOSPITAL | Age: 45
Discharge: HOME | End: 2025-01-25
Attending: EMERGENCY MEDICINE
Payer: COMMERCIAL

## 2025-01-25 ENCOUNTER — APPOINTMENT (EMERGENCY)
Dept: RADIOLOGY | Facility: HOSPITAL | Age: 45
End: 2025-01-25
Payer: COMMERCIAL

## 2025-01-25 VITALS
WEIGHT: 123.9 LBS | RESPIRATION RATE: 12 BRPM | TEMPERATURE: 98.1 F | SYSTOLIC BLOOD PRESSURE: 112 MMHG | HEART RATE: 74 BPM | DIASTOLIC BLOOD PRESSURE: 60 MMHG | OXYGEN SATURATION: 97 % | BODY MASS INDEX: 25.02 KG/M2

## 2025-01-25 DIAGNOSIS — M25.532 LEFT WRIST PAIN: Primary | ICD-10-CM

## 2025-01-25 PROCEDURE — 99284 EMERGENCY DEPT VISIT MOD MDM: CPT | Performed by: EMERGENCY MEDICINE

## 2025-01-25 PROCEDURE — 99283 EMERGENCY DEPT VISIT LOW MDM: CPT

## 2025-01-25 PROCEDURE — 73110 X-RAY EXAM OF WRIST: CPT

## 2025-01-25 RX ORDER — IBUPROFEN 400 MG/1
800 TABLET, FILM COATED ORAL ONCE
Status: DISCONTINUED | OUTPATIENT
Start: 2025-01-25 | End: 2025-01-25 | Stop reason: HOSPADM

## 2025-01-25 NOTE — ED PROVIDER NOTES
"Time reflects when diagnosis was documented in both MDM as applicable and the Disposition within this note       Time User Action Codes Description Comment    1/25/2025  7:45 AM Justin Blue Add [M25.532] Left wrist pain           ED Disposition       ED Disposition   Discharge    Condition   Stable    Date/Time   Sat Jan 25, 2025  7:44 AM    Comment   Marya Bello discharge to home/self care.                   Assessment & Plan       Medical Decision Making  X-ray my read shows no fractures normal alignment patient will put in a volar splint for comfort follow-up with Ortho if she did not want any pain medication here or prescription    Amount and/or Complexity of Data Reviewed  Labs: ordered.  Radiology: ordered and independent interpretation performed.    Risk  Prescription drug management.             Medications   ibuprofen (MOTRIN) tablet 800 mg (800 mg Oral Not Given 1/25/25 0721)       ED Risk Strat Scores                          SBIRT 20yo+      Flowsheet Row Most Recent Value   Initial Alcohol Screen: US AUDIT-C     1. How often do you have a drink containing alcohol? 0 Filed at: 01/25/2025 0710   2. How many drinks containing alcohol do you have on a typical day you are drinking?  0 Filed at: 01/25/2025 0710   3b. FEMALE Any Age, or MALE 65+: How often do you have 4 or more drinks on one occassion? 0 Filed at: 01/25/2025 0710   Audit-C Score 0 Filed at: 01/25/2025 0710   FARAZ: How many times in the past year have you...    Used an illegal drug or used a prescription medication for non-medical reasons? Never Filed at: 01/25/2025 0710                            History of Present Illness       Chief Complaint   Patient presents with    Wrist Pain     Left wrist pain for a few days due to moving and carrying heavy items. Did not get seen before because she \"thought it would get better,but it didn't.\"       History reviewed. No pertinent past medical history.   Past Surgical History:   Procedure " Laterality Date    TUBAL LIGATION Bilateral       Family History   Problem Relation Age of Onset    No Known Problems Mother     No Known Problems Father     No Known Problems Sister     No Known Problems Daughter     No Known Problems Daughter     No Known Problems Daughter     No Known Problems Daughter     No Known Problems Maternal Grandmother     No Known Problems Maternal Grandfather     No Known Problems Paternal Grandmother     No Known Problems Paternal Grandfather       Social History     Tobacco Use    Smoking status: Never    Smokeless tobacco: Never   Vaping Use    Vaping status: Never Used   Substance Use Topics    Alcohol use: Never    Drug use: Never      E-Cigarette/Vaping    E-Cigarette Use Never User       E-Cigarette/Vaping Substances    Nicotine No     THC No     CBD No     Flavoring No     Other No     Unknown No       I have reviewed and agree with the history as documented.     Patient was in the process of moving has been doing a lot of lifting she is right-hand-dominant complaining of left wrist pain for 3 to 4 days has not taken anything for it denies any weakness or numb extremity no fever      Wrist Pain  Associated symptoms: no fever and no rash        Review of Systems   Constitutional:  Negative for chills and fever.   Eyes:  Negative for pain.   Skin:  Negative for rash.   All other systems reviewed and are negative.          Objective       ED Triage Vitals [01/25/25 0710]   Temperature Pulse Blood Pressure Respirations SpO2 Patient Position - Orthostatic VS   98.1 °F (36.7 °C) 74 112/60 12 97 % Sitting      Temp Source Heart Rate Source BP Location FiO2 (%) Pain Score    Oral Monitor Left arm -- --      Vitals      Date and Time Temp Pulse SpO2 Resp BP Pain Score FACES Pain Rating User   01/25/25 0710 98.1 °F (36.7 °C) 74 97 % 12 112/60 -- -- ES            Physical Exam  Vitals and nursing note reviewed.   Constitutional:       General: She is not in acute distress.     Appearance:  She is well-developed.   HENT:      Head: Normocephalic and atraumatic.   Eyes:      Conjunctiva/sclera: Conjunctivae normal.   Cardiovascular:      Rate and Rhythm: Normal rate.   Pulmonary:      Effort: Pulmonary effort is normal. No respiratory distress.   Abdominal:      General: There is no distension.      Tenderness: There is no abdominal tenderness.   Musculoskeletal:         General: No swelling.      Cervical back: Neck supple.      Comments: Left wrist mild point tenderness over the distal radius with a volar side there is no swelling or bruising she has got full range of motion in the wrist neurovascular intact no snuffbox tenderness   Skin:     General: Skin is warm and dry.      Capillary Refill: Capillary refill takes less than 2 seconds.   Neurological:      General: No focal deficit present.      Mental Status: She is alert.      Sensory: No sensory deficit.      Motor: No weakness.   Psychiatric:         Mood and Affect: Mood normal.         Results Reviewed       None            XR wrist 3+ views LEFT   ED Interpretation by Justin Blue MD (01/25 0433)   nad          Procedures    ED Medication and Procedure Management   None     Patient's Medications    No medications on file     No discharge procedures on file.  ED SEPSIS DOCUMENTATION   Time reflects when diagnosis was documented in both MDM as applicable and the Disposition within this note       Time User Action Codes Description Comment    1/25/2025  7:45 AM Justin Blue Add [M25.532] Left wrist pain                  Justin Blue MD  01/25/25 2008

## 2025-01-25 NOTE — Clinical Note
Marya Bello was seen and treated in our emergency department on 1/25/2025.                Diagnosis:     Marya  .    She may return on this date: 01/27/2025         If you have any questions or concerns, please don't hesitate to call.      Justin Blue MD    ______________________________           _______________          _______________  Hospital Representative                              Date                                Time

## 2025-02-04 ENCOUNTER — OFFICE VISIT (OUTPATIENT)
Dept: PODIATRY | Facility: CLINIC | Age: 45
End: 2025-02-04
Payer: COMMERCIAL

## 2025-02-04 ENCOUNTER — APPOINTMENT (OUTPATIENT)
Dept: LAB | Age: 45
End: 2025-02-04
Payer: COMMERCIAL

## 2025-02-04 VITALS — WEIGHT: 123 LBS | BODY MASS INDEX: 24.8 KG/M2 | HEIGHT: 59 IN

## 2025-02-04 DIAGNOSIS — B35.1 TINEA UNGUIUM: ICD-10-CM

## 2025-02-04 LAB
ALBUMIN SERPL BCG-MCNC: 4.1 G/DL (ref 3.5–5)
ALP SERPL-CCNC: 40 U/L (ref 34–104)
ALT SERPL W P-5'-P-CCNC: 14 U/L (ref 7–52)
AST SERPL W P-5'-P-CCNC: 17 U/L (ref 13–39)
BILIRUB DIRECT SERPL-MCNC: 0.09 MG/DL (ref 0–0.2)
BILIRUB SERPL-MCNC: 0.52 MG/DL (ref 0.2–1)
PROT SERPL-MCNC: 6.6 G/DL (ref 6.4–8.4)

## 2025-02-04 PROCEDURE — 36415 COLL VENOUS BLD VENIPUNCTURE: CPT

## 2025-02-04 PROCEDURE — 80076 HEPATIC FUNCTION PANEL: CPT

## 2025-02-04 PROCEDURE — 99242 OFF/OP CONSLTJ NEW/EST SF 20: CPT | Performed by: PODIATRIST

## 2025-02-04 RX ORDER — TERBINAFINE HYDROCHLORIDE 250 MG/1
250 TABLET ORAL DAILY
Qty: 30 TABLET | Refills: 2 | Status: SHIPPED | OUTPATIENT
Start: 2025-02-04 | End: 2025-05-05

## 2025-02-04 NOTE — PATIENT INSTRUCTIONS
"Patient Education     Fungal nail infections   The Basics   Written by the doctors and editors at [facility]   What is a fungal nail infection? -- This is an infection that makes the nail thicken or turn white, yellow, or brown. These infections are often caused by the same types of fungus that cause skin infections like ringworm, athlete's foot, and jock itch.  Fungal infections happen in the toenails more often than the fingernails. The infection usually starts on the big toe. It can affect 1 or more nails. People who have a toenail infection might also have athlete's foot.  The medical term for a fungal nail infection is \"onychomycosis.\"  What are the symptoms of a fungal nail infection? -- A fungal nail infection can cause a nail to:   Turn white, yellow, or brown (picture 1)   Thicken, change shape, or lift up   Break off easily   Hurt  Fungal nail infections don't usually lead to serious problems. But sometimes, they can. This is more likely in people who have diabetes or whose bodies have trouble fighting infections. For these people, the nail infection can make them more likely to get other infections.  Is there a test for a fungal nail infection? -- Yes. Usually, your doctor or nurse can tell if you have a fungal nail infection by talking with you and doing an exam. But to make sure, they might take a small sample of the nail. They might look at it under a microscope, or send it to a lab for another doctor to look at. They might also send it for tests that can show which type of fungus is causing the infection.  Can I treat my fungal nail infection on my own? -- You can buy over-the-counter creams or products. But these usually don't work.  How are fungal nail infections treated? -- Treatment depends on how severe the infection is, and how much it bothers you. If your infection is mild or doesn't bother you very much, you might choose not to treat it. An untreated nail infection probably won't go away, but it " "probably won't cause any long-term problems either.  When people do have treatment, it usually involves \"antifungal\" medicines. These require a prescription from your doctor. They are taken by mouth or put on the nail:   Treatment with pills usually lasts a few months. Some people need to get blood tests during this time. That's because these medicines can affect the liver.   If you don't want to or can't take antifungal pills, your doctor will talk with you about other options. These might include using an antifungal medicine on the nail or having surgery to remove the nail.  Before starting any treatment, you should know that:   It can take many months for your nail to look normal again.   There is a chance that the treatment won't work. The infection might not get better, or it might come back. If either of these things happen, your doctor can try another treatment or send you to a specialist.  Can fungal nail infections be prevented? -- Sometimes. To lower your chance of getting one, you can:   Keep your feet clean and dry. The fungus likes to grow in warm and moist places.   Wear flip-flops or other footwear in a gym shower or locker room.   Avoid sharing nail tools, such as clippers and scissors.  What if I want to get pregnant? -- If you are pregnant or want to get pregnant, tell your doctor or nurse. They might recommend that you not take certain antifungal medicines during pregnancy.  All topics are updated as new evidence becomes available and our peer review process is complete.  This topic retrieved from [employer] on: Feb 26, 2024.  Topic 87625 Version 9.0  Release: 32.2.4 - C32.56  © 2024 UpToDate, Inc. and/or its affiliates. All rights reserved.  picture 1: Fungal nail infections     These are examples of fungal nail infections.  Graphic 80153 Version 5.0  Consumer Information Use and Disclaimer   Disclaimer: This generalized information is a limited summary of diagnosis, treatment, and/or medication " information. It is not meant to be comprehensive and should be used as a tool to help the user understand and/or assess potential diagnostic and treatment options. It does NOT include all information about conditions, treatments, medications, side effects, or risks that may apply to a specific patient. It is not intended to be medical advice or a substitute for the medical advice, diagnosis, or treatment of a health care provider based on the health care provider's examination and assessment of a patient's specific and unique circumstances. Patients must speak with a health care provider for complete information about their health, medical questions, and treatment options, including any risks or benefits regarding use of medications. This information does not endorse any treatments or medications as safe, effective, or approved for treating a specific patient. Up[employer]Date, Inc. and its affiliates disclaim any warranty or liability relating to this information or the use thereof.The use of this information is governed by the Terms of Use, available at https://www.[employer].com/en/know/clinical-effectiveness-terms. 2024© UpToDate, Inc. and its affiliates and/or licensors. All rights reserved.  Copyright   © 2024 Up[employer]Date, Inc. and/or its affiliates. All rights reserved.

## 2025-02-04 NOTE — LETTER
February 4, 2025     Yardlie Toussaint-Foster, DO  [address]  [address]  [facility] [address]    Patient: Marya Bello   YOB: 1980   Date of Visit: 2/4/2025       Dear Dr. Toussaint-Foster:    Thank you for referring Marya Bello to me for evaluation. Below are my notes for this consultation.    If you have questions, please do not hesitate to call me. I look forward to following your patient along with you.         Sincerely,        Rk Wyatt DPM        CC: CHIARA Harrington DPM  2/4/2025  9:37 AM  Sign when Signing Visit                 PATIENT:  Marya Bello  1980       ASSESSMENT:     1. Tinea unguium  Ambulatory Referral to Podiatry    Hepatic function panel    terbinafine (LamISIL) 250 mg tablet                PLAN:  1. Reviewed medical records.  Previous labs reviewed.  Patient was counseled and educated on the condition and the diagnosis.    2. The diagnosis, treatment options and prognosis were discussed with the patient.    3. She wishes to try oral antifungal tx.  Will start her on Lamisil.  Discussed possible side effects and risks.  Sent her for LFT.  4. Patient will return in 6 weeks for re-evaluation.       Imaging: I have personally reviewed pertinent films in PACS  Labs, pathology, and Other Studies: I have personally reviewed pertinent reports.        Subjective:       HPI  The patient was referred to my office for evaluation of nail fungus.  She has discoloration and thickening of nail in right great toe in the last 2 years.  She tried topical agents without resolving it.  No redness or swelling.  No associated numbness or paresthesia.  No significant weakness or dysfunction.         The following portions of the patient's history were reviewed and updated as appropriate: allergies, current medications, past family history, past medical history, past social history, past surgical history and problem list.  All pertinent  labs and images were reviewed.      Past Medical History  History reviewed. No pertinent past medical history.    Past Surgical History  Past Surgical History:   Procedure Laterality Date   • TUBAL LIGATION Bilateral         Allergies:  Patient has no known allergies.    Medications:  Current Outpatient Medications   Medication Sig Dispense Refill   • terbinafine (LamISIL) 250 mg tablet Take 1 tablet (250 mg total) by mouth daily 30 tablet 2     No current facility-administered medications for this visit.       Social History:  Social History     Socioeconomic History   • Marital status: /Civil Union     Spouse name: None   • Number of children: None   • Years of education: None   • Highest education level: None   Occupational History   • None   Tobacco Use   • Smoking status: Never   • Smokeless tobacco: Never   Vaping Use   • Vaping status: Never Used   Substance and Sexual Activity   • Alcohol use: Never   • Drug use: Never   • Sexual activity: Yes   Other Topics Concern   • None   Social History Narrative   • None     Social Drivers of Health     Financial Resource Strain: Low Risk  (1/6/2025)    Overall Financial Resource Strain (CARDIA)    • Difficulty of Paying Living Expenses: Not hard at all   Recent Concern: Financial Resource Strain - High Risk (1/3/2025)    Overall Financial Resource Strain (CARDIA)    • Difficulty of Paying Living Expenses: Hard   Food Insecurity: No Food Insecurity (1/6/2025)    Hunger Vital Sign    • Worried About Running Out of Food in the Last Year: Never true    • Ran Out of Food in the Last Year: Never true   Transportation Needs: No Transportation Needs (1/6/2025)    PRAPARE - Transportation    • Lack of Transportation (Medical): No    • Lack of Transportation (Non-Medical): No   Physical Activity: Not on file   Stress: Not on file   Social Connections: Not on file   Intimate Partner Violence: Not on file   Housing Stability: Low Risk  (1/6/2025)    Housing Stability Vital  "Sign    • Unable to Pay for Housing in the Last Year: No    • Number of Times Moved in the Last Year: 1    • Homeless in the Last Year: No          Review of Systems   Constitutional:  Negative for chills and fever.   Respiratory:  Negative for cough and shortness of breath.    Cardiovascular:  Negative for chest pain.   Gastrointestinal:  Negative for nausea and vomiting.   Musculoskeletal:  Negative for gait problem.   Neurological:  Negative for weakness and numbness.         Objective:      Ht 4' 11\" (1.499 m)   Wt 55.8 kg (123 lb)   LMP 12/25/2024 (Exact Date)   BMI 24.84 kg/m²          Physical Exam  Vitals reviewed.   Constitutional:       General: She is not in acute distress.     Appearance: She is not toxic-appearing or diaphoretic.   HENT:      Head: Normocephalic and atraumatic.   Eyes:      Extraocular Movements: Extraocular movements intact.   Cardiovascular:      Rate and Rhythm: Normal rate and regular rhythm.      Pulses: Normal pulses.           Dorsalis pedis pulses are 2+ on the right side and 2+ on the left side.        Posterior tibial pulses are 2+ on the right side and 2+ on the left side.   Pulmonary:      Effort: Pulmonary effort is normal. No respiratory distress.   Musculoskeletal:         General: No swelling or signs of injury.      Cervical back: Normal range of motion and neck supple.      Right lower leg: No edema.      Left lower leg: No edema.      Right foot: No foot drop.      Left foot: No foot drop.   Skin:     General: Skin is warm.      Capillary Refill: Capillary refill takes less than 2 seconds.      Coloration: Skin is not cyanotic or mottled.      Findings: No abscess.      Nails: There is no clubbing.      Comments: Thickening and discoloration of distal medial nail right great toe.     Neurological:      General: No focal deficit present.      Mental Status: She is alert and oriented to person, place, and time.      Cranial Nerves: No cranial nerve deficit.      " Sensory: No sensory deficit.      Motor: No weakness.      Coordination: Coordination normal.   Psychiatric:         Mood and Affect: Mood normal.         Behavior: Behavior normal.         Thought Content: Thought content normal.         Judgment: Judgment normal.

## 2025-02-04 NOTE — PROGRESS NOTES
PATIENT:  Marya Bello  1980       ASSESSMENT:     1. Tinea unguium  Ambulatory Referral to Podiatry    Hepatic function panel    terbinafine (LamISIL) 250 mg tablet                PLAN:  1. Reviewed medical records.  Previous labs reviewed.  Patient was counseled and educated on the condition and the diagnosis.    2. The diagnosis, treatment options and prognosis were discussed with the patient.    3. She wishes to try oral antifungal tx.  Will start her on Lamisil.  Discussed possible side effects and risks.  Sent her for LFT.  4. Patient will return in 6 weeks for re-evaluation.       Imaging: I have personally reviewed pertinent films in PACS  Labs, pathology, and Other Studies: I have personally reviewed pertinent reports.        Subjective:       HPI  The patient was referred to my office for evaluation of nail fungus.  She has discoloration and thickening of nail in right great toe in the last 2 years.  She tried topical agents without resolving it.  No redness or swelling.  No associated numbness or paresthesia.  No significant weakness or dysfunction.         The following portions of the patient's history were reviewed and updated as appropriate: allergies, current medications, past family history, past medical history, past social history, past surgical history and problem list.  All pertinent labs and images were reviewed.      Past Medical History  History reviewed. No pertinent past medical history.    Past Surgical History  Past Surgical History:   Procedure Laterality Date    TUBAL LIGATION Bilateral         Allergies:  Patient has no known allergies.    Medications:  Current Outpatient Medications   Medication Sig Dispense Refill    terbinafine (LamISIL) 250 mg tablet Take 1 tablet (250 mg total) by mouth daily 30 tablet 2     No current facility-administered medications for this visit.       Social History:  Social History     Socioeconomic History    Marital status:  "/Civil Union     Spouse name: None    Number of children: None    Years of education: None    Highest education level: None   Occupational History    None   Tobacco Use    Smoking status: Never    Smokeless tobacco: Never   Vaping Use    Vaping status: Never Used   Substance and Sexual Activity    Alcohol use: Never    Drug use: Never    Sexual activity: Yes   Other Topics Concern    None   Social History Narrative    None     Social Drivers of Health     Financial Resource Strain: Low Risk  (1/6/2025)    Overall Financial Resource Strain (CARDIA)     Difficulty of Paying Living Expenses: Not hard at all   Recent Concern: Financial Resource Strain - High Risk (1/3/2025)    Overall Financial Resource Strain (CARDIA)     Difficulty of Paying Living Expenses: Hard   Food Insecurity: No Food Insecurity (1/6/2025)    Hunger Vital Sign     Worried About Running Out of Food in the Last Year: Never true     Ran Out of Food in the Last Year: Never true   Transportation Needs: No Transportation Needs (1/6/2025)    PRAPARE - Transportation     Lack of Transportation (Medical): No     Lack of Transportation (Non-Medical): No   Physical Activity: Not on file   Stress: Not on file   Social Connections: Not on file   Intimate Partner Violence: Not on file   Housing Stability: Low Risk  (1/6/2025)    Housing Stability Vital Sign     Unable to Pay for Housing in the Last Year: No     Number of Times Moved in the Last Year: 1     Homeless in the Last Year: No          Review of Systems   Constitutional:  Negative for chills and fever.   Respiratory:  Negative for cough and shortness of breath.    Cardiovascular:  Negative for chest pain.   Gastrointestinal:  Negative for nausea and vomiting.   Musculoskeletal:  Negative for gait problem.   Neurological:  Negative for weakness and numbness.         Objective:      Ht 4' 11\" (1.499 m)   Wt 55.8 kg (123 lb)   LMP 12/25/2024 (Exact Date)   BMI 24.84 kg/m²          Physical " Exam  Vitals reviewed.   Constitutional:       General: She is not in acute distress.     Appearance: She is not toxic-appearing or diaphoretic.   HENT:      Head: Normocephalic and atraumatic.   Eyes:      Extraocular Movements: Extraocular movements intact.   Cardiovascular:      Rate and Rhythm: Normal rate and regular rhythm.      Pulses: Normal pulses.           Dorsalis pedis pulses are 2+ on the right side and 2+ on the left side.        Posterior tibial pulses are 2+ on the right side and 2+ on the left side.   Pulmonary:      Effort: Pulmonary effort is normal. No respiratory distress.   Musculoskeletal:         General: No swelling or signs of injury.      Cervical back: Normal range of motion and neck supple.      Right lower leg: No edema.      Left lower leg: No edema.      Right foot: No foot drop.      Left foot: No foot drop.   Skin:     General: Skin is warm.      Capillary Refill: Capillary refill takes less than 2 seconds.      Coloration: Skin is not cyanotic or mottled.      Findings: No abscess.      Nails: There is no clubbing.      Comments: Thickening and discoloration of distal medial nail right great toe.     Neurological:      General: No focal deficit present.      Mental Status: She is alert and oriented to person, place, and time.      Cranial Nerves: No cranial nerve deficit.      Sensory: No sensory deficit.      Motor: No weakness.      Coordination: Coordination normal.   Psychiatric:         Mood and Affect: Mood normal.         Behavior: Behavior normal.         Thought Content: Thought content normal.         Judgment: Judgment normal.

## 2025-02-10 ENCOUNTER — RESULTS FOLLOW-UP (OUTPATIENT)
Dept: PODIATRY | Facility: CLINIC | Age: 45
End: 2025-02-10

## 2025-02-28 ENCOUNTER — TELEPHONE (OUTPATIENT)
Dept: OBGYN CLINIC | Facility: CLINIC | Age: 45
End: 2025-02-28

## 2025-02-28 NOTE — TELEPHONE ENCOUNTER
"Patient called and left voicemail:    \"Marya Scott, [phone].\"    Returned patient call and confirmed appointment on Monday.     "

## 2025-03-03 ENCOUNTER — CLINICAL SUPPORT (OUTPATIENT)
Dept: OBGYN CLINIC | Facility: CLINIC | Age: 45
End: 2025-03-03

## 2025-03-03 VITALS
WEIGHT: 121.8 LBS | BODY MASS INDEX: 24.56 KG/M2 | SYSTOLIC BLOOD PRESSURE: 102 MMHG | DIASTOLIC BLOOD PRESSURE: 70 MMHG | HEIGHT: 59 IN

## 2025-03-03 DIAGNOSIS — Z23 NEED FOR HPV VACCINATION: Primary | ICD-10-CM

## 2025-03-03 PROCEDURE — 90651 9VHPV VACCINE 2/3 DOSE IM: CPT

## 2025-03-03 PROCEDURE — 90471 IMMUNIZATION ADMIN: CPT

## 2025-03-18 ENCOUNTER — OFFICE VISIT (OUTPATIENT)
Dept: PODIATRY | Facility: CLINIC | Age: 45
End: 2025-03-18
Payer: COMMERCIAL

## 2025-03-18 VITALS — BODY MASS INDEX: 24.39 KG/M2 | WEIGHT: 121 LBS | HEIGHT: 59 IN

## 2025-03-18 DIAGNOSIS — B35.1 TINEA UNGUIUM: Primary | ICD-10-CM

## 2025-03-18 PROCEDURE — 99213 OFFICE O/P EST LOW 20 MIN: CPT | Performed by: PODIATRIST

## 2025-03-18 NOTE — PROGRESS NOTES
PATIENT:  Marya Bello  1980       ASSESSMENT:     1. Tinea unguium  Hepatic function panel                PLAN:  1. Reviewed medical records.  Previous labs reviewed.  Patient was counseled and educated on the condition and the diagnosis.    2. The diagnosis, treatment options and prognosis were discussed with the patient.    3.  Reviewed the last blood work.  Sent her for repeat LFT.  Continue Lamisil.  Discussed possible side effects and risks.  No alcohol while she is on Lamisil.  4. Patient will return in 6 months for re-evaluation.       Imaging: I have personally reviewed pertinent films in PACS  Labs, pathology, and Other Studies: I have personally reviewed pertinent reports.        Subjective:       HPI  The patient presents for follow-up.  She tolerates Lamisil well with no side effects.  No acute pedal disorder.   No redness or swelling.  No associated numbness or paresthesia.  No significant weakness or dysfunction.         The following portions of the patient's history were reviewed and updated as appropriate: allergies, current medications, past family history, past medical history, past social history, past surgical history and problem list.  All pertinent labs and images were reviewed.      Past Medical History  History reviewed. No pertinent past medical history.    Past Surgical History  Past Surgical History:   Procedure Laterality Date    TUBAL LIGATION Bilateral         Allergies:  Patient has no known allergies.    Medications:  Current Outpatient Medications   Medication Sig Dispense Refill    terbinafine (LamISIL) 250 mg tablet Take 1 tablet (250 mg total) by mouth daily 30 tablet 2     No current facility-administered medications for this visit.       Social History:  Social History     Socioeconomic History    Marital status: /Civil Union     Spouse name: None    Number of children: None    Years of education: None    Highest education level: None  "  Occupational History    None   Tobacco Use    Smoking status: Never    Smokeless tobacco: Never   Vaping Use    Vaping status: Never Used   Substance and Sexual Activity    Alcohol use: Never    Drug use: Never    Sexual activity: Yes     Partners: Male   Other Topics Concern    None   Social History Narrative    None     Social Drivers of Health     Financial Resource Strain: Low Risk  (3/3/2025)    Overall Financial Resource Strain (CARDIA)     Difficulty of Paying Living Expenses: Not hard at all   Recent Concern: Financial Resource Strain - High Risk (1/3/2025)    Overall Financial Resource Strain (CARDIA)     Difficulty of Paying Living Expenses: Hard   Food Insecurity: No Food Insecurity (3/3/2025)    Hunger Vital Sign     Worried About Running Out of Food in the Last Year: Never true     Ran Out of Food in the Last Year: Never true   Transportation Needs: No Transportation Needs (3/3/2025)    PRAPARE - Transportation     Lack of Transportation (Medical): No     Lack of Transportation (Non-Medical): No   Physical Activity: Not on file   Stress: Not on file   Social Connections: Not on file   Intimate Partner Violence: Not on file   Housing Stability: Low Risk  (3/3/2025)    Housing Stability Vital Sign     Unable to Pay for Housing in the Last Year: No     Number of Times Moved in the Last Year: 0     Homeless in the Last Year: No          Review of Systems   Constitutional:  Negative for chills and fever.   Respiratory:  Negative for cough and shortness of breath.    Cardiovascular:  Negative for chest pain.   Gastrointestinal:  Negative for nausea and vomiting.   Musculoskeletal:  Negative for gait problem.   Neurological:  Negative for weakness and numbness.         Objective:      Ht 4' 11\" (1.499 m)   Wt 54.9 kg (121 lb)   LMP 02/26/2025 (Exact Date)   BMI 24.44 kg/m²          Physical Exam  Vitals reviewed.   Constitutional:       General: She is not in acute distress.     Appearance: She is not " toxic-appearing or diaphoretic.   HENT:      Head: Normocephalic and atraumatic.   Eyes:      Extraocular Movements: Extraocular movements intact.   Cardiovascular:      Rate and Rhythm: Normal rate and regular rhythm.      Pulses: Normal pulses.           Dorsalis pedis pulses are 2+ on the right side and 2+ on the left side.        Posterior tibial pulses are 2+ on the right side and 2+ on the left side.   Pulmonary:      Effort: Pulmonary effort is normal. No respiratory distress.   Musculoskeletal:         General: No swelling or signs of injury.      Cervical back: Normal range of motion and neck supple.      Right lower leg: No edema.      Left lower leg: No edema.      Right foot: No foot drop.      Left foot: No foot drop.   Skin:     General: Skin is warm.      Capillary Refill: Capillary refill takes less than 2 seconds.      Coloration: Skin is not cyanotic or mottled.      Findings: No abscess.      Nails: There is no clubbing.      Comments: Thickening and discoloration of distal medial nail right great toe.     Neurological:      General: No focal deficit present.      Mental Status: She is alert and oriented to person, place, and time.      Cranial Nerves: No cranial nerve deficit.      Sensory: No sensory deficit.      Motor: No weakness.      Coordination: Coordination normal.   Psychiatric:         Mood and Affect: Mood normal.         Behavior: Behavior normal.         Thought Content: Thought content normal.         Judgment: Judgment normal.

## 2025-07-07 ENCOUNTER — TELEPHONE (OUTPATIENT)
Dept: OBGYN CLINIC | Facility: CLINIC | Age: 45
End: 2025-07-07

## 2025-07-23 ENCOUNTER — HOSPITAL ENCOUNTER (EMERGENCY)
Facility: HOSPITAL | Age: 45
Discharge: HOME | End: 2025-07-23
Attending: EMERGENCY MEDICINE

## 2025-07-23 ENCOUNTER — APPOINTMENT (EMERGENCY)
Dept: RADIOLOGY | Facility: HOSPITAL | Age: 45
End: 2025-07-23

## 2025-07-23 VITALS
DIASTOLIC BLOOD PRESSURE: 76 MMHG | RESPIRATION RATE: 20 BRPM | OXYGEN SATURATION: 99 % | HEART RATE: 79 BPM | TEMPERATURE: 97.6 F | SYSTOLIC BLOOD PRESSURE: 131 MMHG

## 2025-07-23 DIAGNOSIS — M25.512 LEFT SHOULDER PAIN: Primary | ICD-10-CM

## 2025-07-23 PROCEDURE — 99283 EMERGENCY DEPT VISIT LOW MDM: CPT

## 2025-07-23 PROCEDURE — 73030 X-RAY EXAM OF SHOULDER: CPT

## 2025-07-23 PROCEDURE — 99284 EMERGENCY DEPT VISIT MOD MDM: CPT | Performed by: EMERGENCY MEDICINE

## 2025-07-23 RX ORDER — NAPROXEN 500 MG/1
500 TABLET ORAL 2 TIMES DAILY WITH MEALS
Qty: 30 TABLET | Refills: 0 | Status: SHIPPED | OUTPATIENT
Start: 2025-07-23

## 2025-07-23 RX ORDER — LIDOCAINE 50 MG/G
1 PATCH TOPICAL DAILY
Qty: 10 PATCH | Refills: 1 | Status: SHIPPED | OUTPATIENT
Start: 2025-07-23

## 2025-07-23 NOTE — ED PROVIDER NOTES
Time reflects when diagnosis was documented in both MDM as applicable and the Disposition within this note       Time User Action Codes Description Comment    7/23/2025  1:34 AM Roel Gomes Add [M25.512] Left shoulder pain           ED Disposition       ED Disposition   Discharge    Condition   Stable    Date/Time   Wed Jul 23, 2025  1:34 AM    Comment   Marya Bello discharge to home/self care.                   Assessment & Plan       Medical Decision Making  42-year-old female presents emergency department today for evaluation of ongoing right shoulder pain.  On initial examination she is seated upright in a chair in no apparent distress.  She is hemodynamically stable with reassuring vitals.  On examination she is reluctant to move her right shoulder but has full range of motion.  There are some crepitation with passive and active ranging of the joint.  SITS muscles appear to be intact although painful with supraspinatus examination.  There are no neurologic deficits on examination.  She may have injured one of the rotator cuff muscles, likely impingement syndrome.  Fracture ruled out with normal x-ray here in the department this evening.  Also possible that she dislocated immediately relocated the shoulder which may present in a similar manner.  Discussed the possibilities with the patient and all of her questions were answered.  Regardless, I advised her to see physical therapy to prevent frozen shoulder and to further treat her pain.  Referral was placed and patient is amenable to this plan.  Prescription for naproxen called into her pharmacy of choice as the patient did not want a medications here in the department this evening.  She and I discussed worrisome symptoms which would require to seek emergent medical attention to which he verbalized understanding.  She remained hemodynamically stable while under my care and is appropriate for discharge home with outpatient follow-up  instructions.    Amount and/or Complexity of Data Reviewed  Radiology: ordered.    Risk  Prescription drug management.             Medications - No data to display    ED Risk Strat Scores                    No data recorded        SBIRT 22yo+      Flowsheet Row Most Recent Value   Initial Alcohol Screen: US AUDIT-C     1. How often do you have a drink containing alcohol? 0 Filed at: 07/23/2025 0042   2. How many drinks containing alcohol do you have on a typical day you are drinking?  0 Filed at: 07/23/2025 0042   3b. FEMALE Any Age, or MALE 65+: How often do you have 4 or more drinks on one occassion? 0 Filed at: 07/23/2025 0042   Audit-C Score 0 Filed at: 07/23/2025 0042   FARAZ: How many times in the past year have you...    Used an illegal drug or used a prescription medication for non-medical reasons? Never Filed at: 07/23/2025 0042                            History of Present Illness       Chief Complaint   Patient presents with    Arm Pain     Had R arm injury in the beginning of June when she fell down steps; pain continuing and worsening from initial injury from shoulder down entire arm. Decreased ROM with intermittent tingling       Past Medical History[1]   Past Surgical History[2]   Family History[3]   Social History[4]   E-Cigarette/Vaping    E-Cigarette Use Never User       E-Cigarette/Vaping Substances    Nicotine No     THC No     CBD No     Flavoring No     Other No     Unknown No       I have reviewed and agree with the history as documented.     44-year-old female presents to the emergency department today for evaluation of ongoing right shoulder pain.  She says about a month ago she felt and injured the right shoulder.  She tells me she did not seek medical attention at that time and thought the pain get better.  She does me that she has been able to move her arm but it has been increasingly more painful and she feels that her range of motion has been more restricted recently.  She said the pain  starts in her shoulder and will jolt down to about the mid bicep level.  It is present more often the night but it is intermittent in nature.  She denies numbness tingling or weakness of the upper extremity.  She feels when she lays on the shoulder she hears a clicking and popping.  She has not been using medications at home for her pain.  Denies other injuries or concerns at this time.        Review of Systems   Constitutional:  Negative for activity change, chills and fever.   Respiratory:  Negative for shortness of breath.    Cardiovascular:  Negative for chest pain.   Musculoskeletal:  Negative for back pain and neck pain.   Skin:  Negative for rash and wound.   Neurological:  Negative for dizziness, tremors, syncope, weakness, light-headedness, numbness and headaches.           Objective       ED Triage Vitals [07/23/25 0036]   Temperature Pulse Blood Pressure Respirations SpO2 Patient Position - Orthostatic VS   97.6 °F (36.4 °C) 79 131/76 20 99 % --      Temp Source Heart Rate Source BP Location FiO2 (%) Pain Score    Tympanic Monitor Left arm -- 7      Vitals      Date and Time Temp Pulse SpO2 Resp BP Pain Score FACES Pain Rating User   07/23/25 0036 97.6 °F (36.4 °C) 79 99 % 20 131/76 7 -- JLM            Physical Exam  Vitals reviewed.   Constitutional:       Appearance: Normal appearance.   HENT:      Head: Normocephalic and atraumatic.   Pulmonary:      Effort: Pulmonary effort is normal. No respiratory distress.     Musculoskeletal:      Cervical back: Normal range of motion. No rigidity.      Comments: Range of motion preserved in the right shoulder however patient is reluctant to move it.  Deltoid biceps triceps 5-5 and symmetric bilaterally.   strength 5-5 and symmetric bilaterally.  No neuromuscular deficits on examination.  Axillary nerve intact.     Skin:     General: Skin is warm and dry.      Capillary Refill: Capillary refill takes less than 2 seconds.      Findings: No bruising, erythema or  lesion.     Neurological:      Mental Status: She is alert and oriented to person, place, and time.         Results Reviewed       None            XR shoulder 2+ views RIGHT    (Results Pending)       Procedures    ED Medication and Procedure Management   None     Patient's Medications   Discharge Prescriptions    NAPROXEN (NAPROSYN) 500 MG TABLET    Take 1 tablet (500 mg total) by mouth 2 (two) times a day with meals       Start Date: 7/23/2025 End Date: --       Order Dose: 500 mg       Quantity: 30 tablet    Refills: 0       ED SEPSIS DOCUMENTATION   Time reflects when diagnosis was documented in both MDM as applicable and the Disposition within this note       Time User Action Codes Description Comment    7/23/2025  1:34 AM Roel Gomes Add [M25.512] Left shoulder pain                      [1] No past medical history on file.  [2]   Past Surgical History:  Procedure Laterality Date    TUBAL LIGATION Bilateral    [3]   Family History  Problem Relation Name Age of Onset    No Known Problems Mother      No Known Problems Father      No Known Problems Sister      No Known Problems Daughter      No Known Problems Daughter      No Known Problems Daughter      No Known Problems Daughter      No Known Problems Maternal Grandmother      No Known Problems Maternal Grandfather      No Known Problems Paternal Grandmother      No Known Problems Paternal Grandfather     [4]   Social History  Tobacco Use    Smoking status: Never    Smokeless tobacco: Never   Vaping Use    Vaping status: Never Used   Substance Use Topics    Alcohol use: Never    Drug use: Never        Roel Gomes MD  07/23/25 0223

## 2025-07-23 NOTE — DISCHARGE INSTRUCTIONS
Please call to schedule an appointment with physical therapy.  Return to the ER for any of the worrisome symptoms which we discussed during your visit this evening.

## 2025-07-23 NOTE — ED ATTENDING ATTESTATION
7/23/2025  I, Luis Carlos Garcia Jr, DO, saw and evaluated the patient. I have discussed the patient with the resident/non-physician practitioner and agree with the resident's/non-physician practitioner's findings, Plan of Care, and MDM as documented in the resident's/non-physician practitioner's note, except where noted. All available labs and Radiology studies were reviewed.  I was present for key portions of any procedure(s) performed by the resident/non-physician practitioner and I was immediately available to provide assistance.       At this point I agree with the current assessment done in the Emergency Department.  I have conducted an independent evaluation of this patient a history and physical is as follows:    Final Diagnosis:  1. Left shoulder pain            MDM       Differential diagnosis to include but not limited to fracture, dislocation, sprain, strain, rotator cuff injury, AC joint separation, internal derangement    Patient does have full range of motion.  Neurovascularly intact.  No identifiable dislocation, crepitus or fracture on exam.    X-rays negative for fracture or dislocation.    Plan is supportive care with both topical and oral medications, referral for physical therapy and follow-up with orthopedics and/or sports medicine as needed.        Lab Results:   Abnormal Labs Reviewed - No data to display  Lab Results: I have personally reviewed pertinent lab results.    Imaging:   XR shoulder 2+ views RIGHT    (Results Pending)     I have personally reviewed pertinent reports.    EKG, Pathology, and Other Studies: I have personally reviewed pertinent films in PACS    Clinical Impression:    Final diagnoses:   Left shoulder pain         Disposition    discharged           New Prescriptions:    Discharge Medication List as of 7/23/2025  2:24 AM        START taking these medications    Details   Diclofenac Sodium (VOLTAREN) 1 % Apply 2 g topically 4 (four) times a day, Starting Wed 7/23/2025,  Normal      lidocaine (Lidoderm) 5 % Apply 1 patch topically daily over 12 hours Remove & Discard patch within 12 hours or as directed by MD, Starting Wed 7/23/2025, Normal      naproxen (Naprosyn) 500 mg tablet Take 1 tablet (500 mg total) by mouth 2 (two) times a day with meals, Starting Wed 7/23/2025, Normal                  Follow-up Instructions:    CHIARA Harrington  [address] [facility] [address] [phone]                History of Present Illness   Marya Bello is a 44 y.o. female who presents with Arm Pain (Had R arm injury in the beginning of June when she fell down steps; pain continuing and worsening from initial injury from shoulder down entire arm. Decreased ROM with intermittent tingling)    has no past medical history on file..         Objective     Vitals:    07/23/25 0036   BP: 131/76   BP Location: Left arm   Pulse: 79   Resp: 20   Temp: 97.6 °F (36.4 °C)   TempSrc: Tympanic   SpO2: 99%     There is no height or weight on file to calculate BMI.  No intake or output data in the 24 hours ending 07/23/25 0341  Invasive Devices       None                   ED Course         Critical Care Time  Procedures

## 2025-08-04 ENCOUNTER — TELEPHONE (OUTPATIENT)
Dept: FAMILY MEDICINE CLINIC | Facility: CLINIC | Age: 45
End: 2025-08-04